# Patient Record
Sex: FEMALE | Race: BLACK OR AFRICAN AMERICAN | NOT HISPANIC OR LATINO | Employment: FULL TIME | URBAN - NONMETROPOLITAN AREA
[De-identification: names, ages, dates, MRNs, and addresses within clinical notes are randomized per-mention and may not be internally consistent; named-entity substitution may affect disease eponyms.]

---

## 2023-01-21 PROBLEM — N76.0 BACTERIAL VAGINOSIS: Status: ACTIVE | Noted: 2023-01-21

## 2023-01-21 PROBLEM — R10.2 FEMALE PELVIC PAIN: Status: ACTIVE | Noted: 2023-01-21

## 2023-01-21 PROBLEM — E78.49 FAMILIAL HYPERLIPIDEMIA: Status: ACTIVE | Noted: 2023-01-21

## 2023-01-21 PROBLEM — L73.2 HIDRADENITIS: Status: ACTIVE | Noted: 2023-01-21

## 2023-01-21 PROBLEM — N89.8 VAGINAL DISCHARGE: Status: ACTIVE | Noted: 2023-01-21

## 2023-01-21 PROBLEM — G89.29 CHRONIC RIGHT SHOULDER PAIN: Status: ACTIVE | Noted: 2023-01-21

## 2023-01-21 PROBLEM — B35.0 TINEA CAPITIS: Status: ACTIVE | Noted: 2023-01-21

## 2023-01-21 PROBLEM — N92.0 MENORRHAGIA: Status: ACTIVE | Noted: 2023-01-21

## 2023-01-21 PROBLEM — R06.02 SHORTNESS OF BREATH: Status: ACTIVE | Noted: 2023-01-21

## 2023-01-21 PROBLEM — J45.909 ASTHMA (HHS-HCC): Status: ACTIVE | Noted: 2023-01-21

## 2023-01-21 PROBLEM — K21.9 ESOPHAGEAL REFLUX: Status: ACTIVE | Noted: 2023-01-21

## 2023-01-21 PROBLEM — M25.511 CHRONIC RIGHT SHOULDER PAIN: Status: ACTIVE | Noted: 2023-01-21

## 2023-01-21 PROBLEM — B35.4 TINEA CORPORIS: Status: ACTIVE | Noted: 2023-01-21

## 2023-01-21 PROBLEM — E78.01 FAMILIAL HYPERCHOLESTEREMIA: Status: ACTIVE | Noted: 2023-01-21

## 2023-01-21 PROBLEM — B96.89 BACTERIAL VAGINOSIS: Status: ACTIVE | Noted: 2023-01-21

## 2023-01-21 PROBLEM — J30.9 ALLERGIC RHINITIS: Status: ACTIVE | Noted: 2023-01-21

## 2023-01-21 PROBLEM — N92.6 IRREGULAR MENSTRUAL CYCLE: Status: ACTIVE | Noted: 2023-01-21

## 2023-01-21 PROBLEM — K59.00 CONSTIPATION: Status: ACTIVE | Noted: 2023-01-21

## 2023-01-21 RX ORDER — ROSUVASTATIN CALCIUM 10 MG/1
10 TABLET, COATED ORAL NIGHTLY
COMMUNITY
Start: 2022-02-23 | End: 2023-04-07 | Stop reason: SDUPTHER

## 2023-01-21 RX ORDER — METRONIDAZOLE 500 MG/1
500 TABLET ORAL 2 TIMES DAILY
COMMUNITY
Start: 2022-05-24 | End: 2023-03-07 | Stop reason: ALTCHOICE

## 2023-01-21 RX ORDER — LEVONORGESTREL/ETHIN.ESTRADIOL 0.1-0.02MG
1 TABLET ORAL DAILY
COMMUNITY
Start: 2020-08-11 | End: 2023-04-07 | Stop reason: SDUPTHER

## 2023-01-21 RX ORDER — ALBUTEROL SULFATE 90 UG/1
1-2 AEROSOL, METERED RESPIRATORY (INHALATION)
COMMUNITY
Start: 2013-09-11 | End: 2023-04-07 | Stop reason: SDUPTHER

## 2023-01-21 RX ORDER — TRIAMCINOLONE ACETONIDE 1 MG/G
CREAM TOPICAL
COMMUNITY
End: 2023-03-07 | Stop reason: ALTCHOICE

## 2023-01-21 RX ORDER — BUDESONIDE AND FORMOTEROL FUMARATE DIHYDRATE 160; 4.5 UG/1; UG/1
2 AEROSOL RESPIRATORY (INHALATION) DAILY
COMMUNITY
Start: 2013-10-14 | End: 2023-04-07 | Stop reason: SDUPTHER

## 2023-03-07 ENCOUNTER — PROCEDURE VISIT (OUTPATIENT)
Dept: PRIMARY CARE | Facility: CLINIC | Age: 28
End: 2023-03-07
Payer: COMMERCIAL

## 2023-03-07 VITALS
BODY MASS INDEX: 26.05 KG/M2 | HEART RATE: 67 BPM | DIASTOLIC BLOOD PRESSURE: 80 MMHG | HEIGHT: 70 IN | OXYGEN SATURATION: 99 % | SYSTOLIC BLOOD PRESSURE: 136 MMHG | WEIGHT: 181.99 LBS

## 2023-03-07 DIAGNOSIS — E78.49 FAMILIAL HYPERLIPIDEMIA: ICD-10-CM

## 2023-03-07 DIAGNOSIS — Z01.419 ENCOUNTER FOR ANNUAL ROUTINE GYNECOLOGICAL EXAMINATION: ICD-10-CM

## 2023-03-07 DIAGNOSIS — N76.0 ACUTE VAGINITIS: ICD-10-CM

## 2023-03-07 DIAGNOSIS — B37.31 VULVAR CANDIDIASIS: ICD-10-CM

## 2023-03-07 DIAGNOSIS — J45.40 MODERATE PERSISTENT ASTHMA WITHOUT COMPLICATION (HHS-HCC): ICD-10-CM

## 2023-03-07 DIAGNOSIS — Z00.00 HEALTHCARE MAINTENANCE: Primary | ICD-10-CM

## 2023-03-07 DIAGNOSIS — R53.83 FATIGUE, UNSPECIFIED TYPE: ICD-10-CM

## 2023-03-07 PROBLEM — B35.4 TINEA CORPORIS: Status: RESOLVED | Noted: 2023-01-21 | Resolved: 2023-03-07

## 2023-03-07 PROBLEM — B35.0 TINEA CAPITIS: Status: RESOLVED | Noted: 2023-01-21 | Resolved: 2023-03-07

## 2023-03-07 PROBLEM — K59.00 CONSTIPATION: Status: RESOLVED | Noted: 2023-01-21 | Resolved: 2023-03-07

## 2023-03-07 PROBLEM — B96.89 BACTERIAL VAGINOSIS: Status: RESOLVED | Noted: 2023-01-21 | Resolved: 2023-03-07

## 2023-03-07 PROBLEM — R06.02 SHORTNESS OF BREATH: Status: RESOLVED | Noted: 2023-01-21 | Resolved: 2023-03-07

## 2023-03-07 PROBLEM — G89.29 CHRONIC RIGHT SHOULDER PAIN: Status: RESOLVED | Noted: 2023-01-21 | Resolved: 2023-03-07

## 2023-03-07 PROBLEM — N89.8 VAGINAL DISCHARGE: Status: RESOLVED | Noted: 2023-01-21 | Resolved: 2023-03-07

## 2023-03-07 PROBLEM — R10.2 FEMALE PELVIC PAIN: Status: RESOLVED | Noted: 2023-01-21 | Resolved: 2023-03-07

## 2023-03-07 PROBLEM — M25.511 CHRONIC RIGHT SHOULDER PAIN: Status: RESOLVED | Noted: 2023-01-21 | Resolved: 2023-03-07

## 2023-03-07 PROBLEM — K21.9 ESOPHAGEAL REFLUX: Status: RESOLVED | Noted: 2023-01-21 | Resolved: 2023-03-07

## 2023-03-07 PROCEDURE — 80053 COMPREHEN METABOLIC PANEL: CPT

## 2023-03-07 PROCEDURE — 36415 COLL VENOUS BLD VENIPUNCTURE: CPT | Performed by: FAMILY MEDICINE

## 2023-03-07 PROCEDURE — 87210 SMEAR WET MOUNT SALINE/INK: CPT

## 2023-03-07 PROCEDURE — 99395 PREV VISIT EST AGE 18-39: CPT | Performed by: FAMILY MEDICINE

## 2023-03-07 PROCEDURE — 80061 LIPID PANEL: CPT

## 2023-03-07 PROCEDURE — 87591 N.GONORRHOEAE DNA AMP PROB: CPT

## 2023-03-07 PROCEDURE — 84443 ASSAY THYROID STIM HORMONE: CPT

## 2023-03-07 PROCEDURE — 87491 CHLMYD TRACH DNA AMP PROBE: CPT

## 2023-03-07 PROCEDURE — 99214 OFFICE O/P EST MOD 30 MIN: CPT | Performed by: FAMILY MEDICINE

## 2023-03-07 NOTE — PROGRESS NOTES
"Subjective   Patient ID: Jeff Hernandez is a 27 y.o. female who presents for Gynecologic Exam ([facility] ).    HPI     Concerns today  -   Has gained 20 pounds   Exhausted - sleeping a lot , poor motivation  Worried about thyroid    Mood is down right now - winter -  She states otherwise things are going well   Motivation is poor     Moving in July     Missing rosuvastatin often     Bloated and gassy a lot         Last pap:   2022  LMP :   23    (Need GC/CT screen?)  std recent normal     : 0  Para: 0    Birth Control?  :  on OCP   gyn problems? :  none   pelvic symptoms? :  none       Last mammogram:   too young   Breast issues?  :   none   Is there a family hx of BRCA ? :  YES  -  great aunts   Has pt every had breast biopsy ? :  none       Bone density assessment:     TOO YOUNG   Last DEXA Scan  :  too young   fracture history  :    Calcium and Vit D intake :       Last colonoscopy or colon cancer screen :   TOO YOUNG    FAMILY HX OF COLON CA?  :    none     Last cholesterol level:   will do today     Last blood sugar level :    will do today     Hep C screen?  :  neg 2022   HIV screen?  :   neg 2022     vaccines -   FLU:   declined                     PREVNAR:  declined                       ZOSTER:    had chicken pox about age 8                       TETNAS/PERTUSSIS:                         HPV: done                       OTHER:      vision -    last appt:    needs to go     dentist -    last appt:       BMI/weight :   wt up   nutrition :  very good   exercise :  very good  - but less than usual     depression  :    mood is down for winter     sleep:   a lot     Need coronary calcium score?  :   TOO YOUNG     LIVING WILL/MEDICAL POA :   not yet             On chart?  :        Review of Systems    Objective   /80   Pulse 67   Ht 1.778 m (5' 10\")   Wt 82.6 kg (181 lb 15.8 oz) Comment: 182lb  LMP 2023   SpO2 99%   BMI 26.11 kg/m²     Physical Exam  Constitutional:       " Appearance: Normal appearance.   HENT:      Head: Normocephalic and atraumatic.      Right Ear: Tympanic membrane, ear canal and external ear normal.      Left Ear: Tympanic membrane, ear canal and external ear normal.      Nose: Nose normal.      Mouth/Throat:      Mouth: Mucous membranes are moist.      Pharynx: No posterior oropharyngeal erythema.   Eyes:      General: No scleral icterus.     Extraocular Movements: Extraocular movements intact.      Pupils: Pupils are equal, round, and reactive to light.   Cardiovascular:      Rate and Rhythm: Normal rate and regular rhythm.      Pulses: Normal pulses.      Heart sounds: Normal heart sounds. No murmur heard.  Pulmonary:      Effort: Pulmonary effort is normal. No respiratory distress.      Breath sounds: Normal breath sounds. No wheezing.   Chest:      Chest wall: No deformity.   Breasts:     Cosmo Score is 5.      Breasts are symmetrical.      Right: Normal. No mass.      Left: Normal. No mass.   Abdominal:      General: Bowel sounds are normal. There is no distension.      Palpations: Abdomen is soft.      Tenderness: There is no abdominal tenderness.   Genitourinary:     General: Normal vulva.      Exam position: Lithotomy position.      Pubic Area: No rash.       Labia:         Right: No lesion.         Left: No lesion.       Vagina: Vaginal discharge present.   Musculoskeletal:         General: Normal range of motion.      Cervical back: Neck supple. No rigidity.      Right lower leg: No edema.      Left lower leg: No edema.   Lymphadenopathy:      Cervical: No cervical adenopathy.      Upper Body:      Right upper body: No axillary adenopathy.      Left upper body: No axillary adenopathy.   Skin:     General: Skin is warm and dry.      Findings: No rash.   Neurological:      General: No focal deficit present.      Mental Status: She is alert and oriented to person, place, and time.   Psychiatric:         Mood and Affect: Mood normal.         Thought  Content: Thought content normal.         Assessment/Plan   Problem List Items Addressed This Visit          Respiratory    Asthma     Continue inhalers             Genitourinary    Acute vaginitis     Sent GC/CT and wet prep for analysis          Relevant Orders    Wet Prep, Genital    C. trachomatis / N. gonorrhoeae, DNA probe       Other    Familial hyperlipidemia     Labs today and cont rosuvasatatin          Relevant Orders    Comprehensive metabolic panel    Lipid panel    Healthcare maintenance - Primary     Not feeling well today  -   Wt gain , fatigue -   Would like TSH checked  -   But discussed possible SAD as well          Encounter for annual routine gynecological examination     Pap not done today   Discussed self breast exam             Other Visit Diagnoses       Fatigue, unspecified type        Relevant Orders    TSH with reflex to Free T4 if abnormal (Completed)

## 2023-03-08 PROBLEM — N76.0 ACUTE VAGINITIS: Status: ACTIVE | Noted: 2023-03-08

## 2023-03-08 PROBLEM — Z01.419 ENCOUNTER FOR ANNUAL ROUTINE GYNECOLOGICAL EXAMINATION: Status: ACTIVE | Noted: 2023-03-08

## 2023-03-08 PROBLEM — Z00.00 HEALTHCARE MAINTENANCE: Status: ACTIVE | Noted: 2023-03-08

## 2023-03-08 LAB
CHLAMYDIA TRACH., AMPLIFIED: NEGATIVE
CLUE CELLS WET PREP: ABNORMAL
N. GONORRHEA, AMPLIFIED: NEGATIVE
THYROTROPIN (MIU/L) IN SER/PLAS BY DETECTION LIMIT <= 0.05 MIU/L: 1.72 MIU/L (ref 0.44–3.98)
TRICH WET PREP: ABNORMAL
WBC WET PREP: ABNORMAL /HPF
YEAST PRESENCE WET PREP: PRESENT

## 2023-03-08 RX ORDER — FLUCONAZOLE 150 MG/1
150 TABLET ORAL ONCE
Qty: 1 TABLET | Refills: 1 | Status: SHIPPED | OUTPATIENT
Start: 2023-03-08 | End: 2023-03-08

## 2023-03-08 NOTE — ASSESSMENT & PLAN NOTE
Not feeling well today  -   Wt gain , fatigue -   Would like TSH checked  -   But discussed possible SAD as well

## 2023-03-09 LAB
ALANINE AMINOTRANSFERASE (SGPT) (U/L) IN SER/PLAS: 14 U/L (ref 7–45)
ALBUMIN (G/DL) IN SER/PLAS: 4.5 G/DL (ref 3.4–5)
ALKALINE PHOSPHATASE (U/L) IN SER/PLAS: 60 U/L (ref 33–110)
ANION GAP IN SER/PLAS: 14 MMOL/L (ref 10–20)
ASPARTATE AMINOTRANSFERASE (SGOT) (U/L) IN SER/PLAS: 14 U/L (ref 9–39)
BILIRUBIN TOTAL (MG/DL) IN SER/PLAS: 0.7 MG/DL (ref 0–1.2)
CALCIUM (MG/DL) IN SER/PLAS: 9.3 MG/DL (ref 8.6–10.3)
CARBON DIOXIDE, TOTAL (MMOL/L) IN SER/PLAS: 26 MMOL/L (ref 21–32)
CHLORIDE (MMOL/L) IN SER/PLAS: 101 MMOL/L (ref 98–107)
CHOLESTEROL (MG/DL) IN SER/PLAS: 229 MG/DL (ref 0–199)
CHOLESTEROL IN HDL (MG/DL) IN SER/PLAS: 65.3 MG/DL
CHOLESTEROL/HDL RATIO: 3.5
CREATININE (MG/DL) IN SER/PLAS: 0.9 MG/DL (ref 0.5–1.05)
GFR FEMALE: 90 ML/MIN/1.73M2
GLUCOSE (MG/DL) IN SER/PLAS: 86 MG/DL (ref 74–99)
LDL: 145 MG/DL (ref 0–99)
POTASSIUM (MMOL/L) IN SER/PLAS: 4 MMOL/L (ref 3.5–5.3)
PROTEIN TOTAL: 6.5 G/DL (ref 6.4–8.2)
SODIUM (MMOL/L) IN SER/PLAS: 137 MMOL/L (ref 136–145)
TRIGLYCERIDE (MG/DL) IN SER/PLAS: 92 MG/DL (ref 0–149)
UREA NITROGEN (MG/DL) IN SER/PLAS: 9 MG/DL (ref 6–23)
VLDL: 18 MG/DL (ref 0–40)

## 2023-03-10 DIAGNOSIS — F33.8 SEASONAL AFFECTIVE DISORDER (CMS-HCC): Primary | ICD-10-CM

## 2023-03-10 RX ORDER — BUPROPION HYDROCHLORIDE 150 MG/1
150 TABLET ORAL EVERY MORNING
Qty: 30 TABLET | Refills: 1 | Status: SHIPPED | OUTPATIENT
Start: 2023-03-10 | End: 2023-04-03

## 2023-04-03 DIAGNOSIS — F33.8 SEASONAL AFFECTIVE DISORDER (CMS-HCC): ICD-10-CM

## 2023-04-03 RX ORDER — BUPROPION HYDROCHLORIDE 150 MG/1
150 TABLET ORAL EVERY MORNING
Qty: 30 TABLET | Refills: 1 | Status: SHIPPED | OUTPATIENT
Start: 2023-04-03 | End: 2023-04-07 | Stop reason: SINTOL

## 2023-04-06 ENCOUNTER — APPOINTMENT (OUTPATIENT)
Dept: PRIMARY CARE | Facility: CLINIC | Age: 28
End: 2023-04-06
Payer: COMMERCIAL

## 2023-04-07 ENCOUNTER — TELEMEDICINE (OUTPATIENT)
Dept: PRIMARY CARE | Facility: CLINIC | Age: 28
End: 2023-04-07
Payer: COMMERCIAL

## 2023-04-07 DIAGNOSIS — J45.40 MODERATE PERSISTENT ASTHMA WITHOUT COMPLICATION (HHS-HCC): ICD-10-CM

## 2023-04-07 DIAGNOSIS — N92.6 IRREGULAR MENSTRUAL CYCLE: ICD-10-CM

## 2023-04-07 DIAGNOSIS — F33.8 SEASONAL AFFECTIVE DISORDER (CMS-HCC): ICD-10-CM

## 2023-04-07 DIAGNOSIS — E78.49 FAMILIAL HYPERLIPIDEMIA: Primary | ICD-10-CM

## 2023-04-07 PROCEDURE — 99213 OFFICE O/P EST LOW 20 MIN: CPT | Performed by: FAMILY MEDICINE

## 2023-04-07 RX ORDER — ALBUTEROL SULFATE 90 UG/1
1-2 AEROSOL, METERED RESPIRATORY (INHALATION) EVERY 4 HOURS PRN
Qty: 18 G | Refills: 1 | Status: SHIPPED | OUTPATIENT
Start: 2023-04-07

## 2023-04-07 RX ORDER — ROSUVASTATIN CALCIUM 10 MG/1
10 TABLET, COATED ORAL NIGHTLY
Qty: 90 TABLET | Refills: 3 | Status: SHIPPED | OUTPATIENT
Start: 2023-04-07

## 2023-04-07 RX ORDER — FLUTICASONE FUROATE AND VILANTEROL 200; 25 UG/1; UG/1
1 POWDER RESPIRATORY (INHALATION) DAILY
Qty: 28 EACH | Refills: 11 | Status: SHIPPED | OUTPATIENT
Start: 2023-04-07 | End: 2023-10-13

## 2023-04-07 RX ORDER — MOMETASONE FUROATE AND FORMOTEROL FUMARATE DIHYDRATE 200; 5 UG/1; UG/1
2 AEROSOL RESPIRATORY (INHALATION)
Qty: 13 G | Refills: 11 | Status: SHIPPED | OUTPATIENT
Start: 2023-04-07 | End: 2023-04-07

## 2023-04-07 RX ORDER — LEVONORGESTREL/ETHIN.ESTRADIOL 0.1-0.02MG
1 TABLET ORAL DAILY
Qty: 84 TABLET | Refills: 3 | Status: SHIPPED | OUTPATIENT
Start: 2023-04-07 | End: 2024-03-11

## 2023-04-07 NOTE — PROGRESS NOTES
Subjective   Patient ID: Jeff Hernandez is a 27 y.o. female who presents for No chief complaint on file..    HPI     Virtual appt today     For SAD -  tried Bupropion  -   She could tell she was feeling a little better -   But developed headaches -   When she was on her phone -  eyes started to hurt  and light hurting her eyes - then had it a week  -   So then went to the eye doctor  -  all was fine     So then stopped the Bupropion and 3 days later was all better     Mood was even worse     Percy days - mood is much better   Feels terrible when cold and rainy   Hates that he mood depends on the weather     Today is percy she is busy and feels fine    Moving in July to a better climate     Willing to hold off on more meds for now     Needs refills too     Review of Systems    Objective   There were no vitals taken for this visit.    Physical Exam  Constitutional:       Appearance: Normal appearance.   HENT:      Head: Normocephalic and atraumatic.      Nose: Nose normal.   Eyes:      Pupils: Pupils are equal, round, and reactive to light.   Pulmonary:      Effort: Pulmonary effort is normal.   Neurological:      General: No focal deficit present.      Mental Status: She is alert and oriented to person, place, and time.   Psychiatric:         Mood and Affect: Mood normal.         Behavior: Behavior normal.         Thought Content: Thought content normal.         Judgment: Judgment normal.         Assessment/Plan   Problem List Items Addressed This Visit          Respiratory    Asthma    Relevant Medications    mometasone-formoterol (Dulera) 200-5 mcg/actuation inhaler    albuterol 90 mcg/actuation inhaler       Genitourinary    Irregular menstrual cycle    Relevant Medications    levonorgestreL-ethinyl estrad (Larissia) 0.1-20 mg-mcg tablet       Other    Familial hyperlipidemia - Primary    Relevant Medications    rosuvastatin (Crestor) 10 mg tablet    Seasonal affective disorder (CMS/HCC)     For SAD - we  are  headed into spring summer and she is moving soon to a better climate -   So will hold off on further meds  -   And refills done  -   Trying to find affordable inhaler  - the EMR says dulera is covered

## 2023-10-13 ENCOUNTER — PATIENT MESSAGE (OUTPATIENT)
Dept: PRIMARY CARE | Facility: CLINIC | Age: 28
End: 2023-10-13
Payer: COMMERCIAL

## 2023-10-13 DIAGNOSIS — J45.40 MODERATE PERSISTENT ASTHMA WITHOUT COMPLICATION (HHS-HCC): Primary | ICD-10-CM

## 2023-10-13 RX ORDER — BUDESONIDE AND FORMOTEROL FUMARATE DIHYDRATE 160; 4.5 UG/1; UG/1
2 AEROSOL RESPIRATORY (INHALATION)
Qty: 10.2 G | Refills: 11 | Status: SHIPPED | OUTPATIENT
Start: 2023-10-13 | End: 2023-12-27 | Stop reason: SDUPTHER

## 2023-12-27 DIAGNOSIS — J45.40 MODERATE PERSISTENT ASTHMA WITHOUT COMPLICATION (HHS-HCC): ICD-10-CM

## 2023-12-27 RX ORDER — BUDESONIDE AND FORMOTEROL FUMARATE DIHYDRATE 160; 4.5 UG/1; UG/1
2 AEROSOL RESPIRATORY (INHALATION)
Qty: 30.6 G | Refills: 0 | Status: SHIPPED | OUTPATIENT
Start: 2023-12-27 | End: 2024-05-13 | Stop reason: SDUPTHER

## 2024-03-11 DIAGNOSIS — N92.6 IRREGULAR MENSTRUAL CYCLE: ICD-10-CM

## 2024-03-11 RX ORDER — LEVONORGESTREL/ETHIN.ESTRADIOL 0.1-0.02MG
1 TABLET ORAL DAILY
Qty: 84 TABLET | Refills: 0 | Status: SHIPPED | OUTPATIENT
Start: 2024-03-11 | End: 2024-05-31

## 2024-05-13 ENCOUNTER — PATIENT MESSAGE (OUTPATIENT)
Dept: PRIMARY CARE | Facility: CLINIC | Age: 29
End: 2024-05-13
Payer: COMMERCIAL

## 2024-05-13 DIAGNOSIS — J45.40 MODERATE PERSISTENT ASTHMA WITHOUT COMPLICATION (HHS-HCC): ICD-10-CM

## 2024-05-13 RX ORDER — BUDESONIDE AND FORMOTEROL FUMARATE DIHYDRATE 160; 4.5 UG/1; UG/1
2 AEROSOL RESPIRATORY (INHALATION)
Qty: 30.6 G | Refills: 3 | Status: SHIPPED | OUTPATIENT
Start: 2024-05-13 | End: 2024-08-11

## 2024-05-31 DIAGNOSIS — N92.6 IRREGULAR MENSTRUAL CYCLE: ICD-10-CM

## 2024-05-31 RX ORDER — LEVONORGESTREL AND ETHINYL ESTRADIOL 0.1-0.02MG
KIT ORAL
Qty: 84 TABLET | Refills: 0 | Status: SHIPPED | OUTPATIENT
Start: 2024-05-31

## 2024-08-15 ENCOUNTER — APPOINTMENT (OUTPATIENT)
Dept: PRIMARY CARE | Facility: CLINIC | Age: 29
End: 2024-08-15
Payer: COMMERCIAL

## 2024-08-15 VITALS
HEART RATE: 78 BPM | BODY MASS INDEX: 24.11 KG/M2 | SYSTOLIC BLOOD PRESSURE: 110 MMHG | WEIGHT: 168 LBS | OXYGEN SATURATION: 98 % | DIASTOLIC BLOOD PRESSURE: 70 MMHG

## 2024-08-15 DIAGNOSIS — J45.40 MODERATE PERSISTENT ASTHMA WITHOUT COMPLICATION (HHS-HCC): ICD-10-CM

## 2024-08-15 DIAGNOSIS — N92.6 IRREGULAR MENSTRUAL CYCLE: ICD-10-CM

## 2024-08-15 PROCEDURE — 99213 OFFICE O/P EST LOW 20 MIN: CPT | Performed by: FAMILY MEDICINE

## 2024-08-15 PROCEDURE — 1036F TOBACCO NON-USER: CPT | Performed by: FAMILY MEDICINE

## 2024-08-15 RX ORDER — LEVONORGESTREL/ETHIN.ESTRADIOL 0.1-0.02MG
1 TABLET ORAL DAILY
Qty: 84 TABLET | Refills: 3 | Status: SHIPPED | OUTPATIENT
Start: 2024-08-15

## 2024-08-15 NOTE — PATIENT INSTRUCTIONS
Lets have you try the Trelegy inhaler in place of Symbicort for 2 weeks -   Rinse with water after -   Let me know if not working in a few days     Next year - you are due for a pap smear - schedule your visit as a well woman exam a few months ahead of when due     Try to take your cholesterol pill more often       For allergy symptoms  -   You can try the over the counter long acting antihistamines :   Claritin  ( loratadine)  or Allegra  (fexofenadine) or Zyrtec (cetirizine) or Xyzal  (levo-cetirizine).   You could also try the steroid nasal sprays:   Fluticasone (Flonase),  Nasacort, or Rhinocort.  Be sure to use them as directed.   If you  do use them,  after you insert them into your nose,   tilt outward toward your eye to avoid nose bleeds.   You may need to stay on these through your allergy season.   You might need only one of these,   but some people need both to control symptoms.    If you try these things for 2 weeks,  and they do not help,  stop them and make an appointment in the office.      Also - for  allergy eye symptoms   (redness, itch , watering)    -  you can try Olopatadine  - an over the counter eye drop that works very well.

## 2024-08-15 NOTE — PROGRESS NOTES
Subjective   Patient ID: Jeff Hernandez is a 28 y.o. female who presents for Follow-up (Yearly check up.).    HPI     Self-pay currently     Working a lot  -   Tired all the time -   [employer] ()     Wt back down -   Lifting and [employer] and eating better     Still on OCP - periods are doing fine on it   Not due for pap until next year     Familial hyper chol -   Still on rosuvastatin - takes only occas   Would like to hold off on meds     Asthma - worse the past few weeks   Sick a few weeks ago -  likely COVID   Flared ever since then  - more SOB   Symbicort 160 2 puffs BID   Not using albuterol     Has  a boyfriend for the past year          Review of Systems    Objective   /70 (BP Location: Right arm, Patient Position: Sitting, BP Cuff Size: Large adult)   Pulse 78   Wt 76.2 kg (168 lb)   SpO2 98%   BMI 24.11 kg/m²     Physical Exam  Vitals reviewed.   Constitutional:       General: She is not in acute distress.     Appearance: Normal appearance.   HENT:      Head: Normocephalic and atraumatic.      Nose: Nose normal.      Mouth/Throat:      Mouth: Mucous membranes are moist.      Pharynx: No posterior oropharyngeal erythema.   Eyes:      Extraocular Movements: Extraocular movements intact.      Conjunctiva/sclera: Conjunctivae normal.      Pupils: Pupils are equal, round, and reactive to light.   Cardiovascular:      Rate and Rhythm: Normal rate and regular rhythm.      Heart sounds: Normal heart sounds. No murmur heard.  Pulmonary:      Effort: Pulmonary effort is normal. No respiratory distress.      Breath sounds: Normal breath sounds. No wheezing.   Musculoskeletal:      Cervical back: No rigidity.   Lymphadenopathy:      Cervical: No cervical adenopathy.   Skin:     General: Skin is warm and dry.      Findings: No rash.   Neurological:      General: No focal deficit present.      Mental Status: She is alert. Mental status is at baseline.   Psychiatric:          Mood and Affect: Mood normal.         Thought Content: Thought content normal.         Assessment/Plan   Problem List Items Addressed This Visit             ICD-10-CM       High    Irregular menstrual cycle N92.6    Relevant Medications    levonorgestreL-ethinyl estrad (Sronyx) 0.1-20 mg-mcg tablet     Asthma   - likely flare after COVID  Trelegy 200 sample daily in place of Symbicort of 2 weeks   To let me know if that does not help     Refill OCP -     [facility] next year - pap due   Urged to take chol meds more often

## 2024-12-18 ENCOUNTER — PATIENT MESSAGE (OUTPATIENT)
Dept: PRIMARY CARE | Facility: CLINIC | Age: 29
End: 2024-12-18
Payer: COMMERCIAL

## 2024-12-18 DIAGNOSIS — J45.40 MODERATE PERSISTENT ASTHMA WITHOUT COMPLICATION (HHS-HCC): ICD-10-CM

## 2024-12-19 RX ORDER — BUDESONIDE AND FORMOTEROL FUMARATE DIHYDRATE 160; 4.5 UG/1; UG/1
2 AEROSOL RESPIRATORY (INHALATION)
Qty: 30.6 G | Refills: 3 | Status: SHIPPED | OUTPATIENT
Start: 2024-12-19 | End: 2025-03-19

## 2025-04-08 DIAGNOSIS — J45.40 MODERATE PERSISTENT ASTHMA WITHOUT COMPLICATION (HHS-HCC): ICD-10-CM

## 2025-04-09 RX ORDER — BUDESONIDE AND FORMOTEROL FUMARATE DIHYDRATE 160; 4.5 UG/1; UG/1
2 AEROSOL RESPIRATORY (INHALATION)
Qty: 30.6 G | Refills: 1 | Status: SHIPPED | OUTPATIENT
Start: 2025-04-09 | End: 2025-07-08

## 2025-05-16 DIAGNOSIS — N92.6 IRREGULAR MENSTRUAL CYCLE: ICD-10-CM

## 2025-05-16 RX ORDER — LEVONORGESTREL AND ETHINYL ESTRADIOL 0.1-0.02MG
1 KIT ORAL DAILY
Qty: 84 TABLET | Refills: 0 | Status: SHIPPED | OUTPATIENT
Start: 2025-05-16 | End: 2025-05-16 | Stop reason: SDUPTHER

## 2025-05-16 RX ORDER — LEVONORGESTREL/ETHIN.ESTRADIOL 0.1-0.02MG
1 TABLET ORAL DAILY
Qty: 84 TABLET | Refills: 0 | Status: SHIPPED | OUTPATIENT
Start: 2025-05-16

## 2025-08-18 ENCOUNTER — APPOINTMENT (OUTPATIENT)
Dept: PRIMARY CARE | Facility: CLINIC | Age: 30
End: 2025-08-18

## 2025-08-18 VITALS
DIASTOLIC BLOOD PRESSURE: 68 MMHG | HEART RATE: 69 BPM | OXYGEN SATURATION: 98 % | WEIGHT: 169 LBS | BODY MASS INDEX: 24.25 KG/M2 | SYSTOLIC BLOOD PRESSURE: 118 MMHG

## 2025-08-18 DIAGNOSIS — N92.6 IRREGULAR MENSTRUAL CYCLE: ICD-10-CM

## 2025-08-18 DIAGNOSIS — J45.20 MILD INTERMITTENT ASTHMA WITHOUT COMPLICATION (HHS-HCC): ICD-10-CM

## 2025-08-18 DIAGNOSIS — E78.49 FAMILIAL HYPERLIPIDEMIA: ICD-10-CM

## 2025-08-18 PROCEDURE — 99213 OFFICE O/P EST LOW 20 MIN: CPT | Performed by: FAMILY MEDICINE

## 2025-08-18 PROCEDURE — 1036F TOBACCO NON-USER: CPT | Performed by: FAMILY MEDICINE

## 2025-08-18 RX ORDER — ROSUVASTATIN CALCIUM 10 MG/1
10 TABLET, COATED ORAL NIGHTLY
Qty: 90 TABLET | Refills: 3 | Status: SHIPPED | OUTPATIENT
Start: 2025-08-18

## 2025-08-18 RX ORDER — ALBUTEROL SULFATE 90 UG/1
1-2 INHALANT RESPIRATORY (INHALATION) EVERY 4 HOURS PRN
Qty: 18 G | Refills: 1 | Status: SHIPPED | OUTPATIENT
Start: 2025-08-18

## 2025-08-18 RX ORDER — LEVONORGESTREL/ETHIN.ESTRADIOL 0.1-0.02MG
1 TABLET ORAL DAILY
Qty: 84 TABLET | Refills: 3 | Status: SHIPPED | OUTPATIENT
Start: 2025-08-18

## 2025-08-18 ASSESSMENT — PATIENT HEALTH QUESTIONNAIRE - PHQ9
2. FEELING DOWN, DEPRESSED OR HOPELESS: NOT AT ALL
1. LITTLE INTEREST OR PLEASURE IN DOING THINGS: NOT AT ALL
SUM OF ALL RESPONSES TO PHQ9 QUESTIONS 1 AND 2: 0